# Patient Record
Sex: FEMALE | Race: WHITE | Employment: STUDENT | URBAN - METROPOLITAN AREA
[De-identification: names, ages, dates, MRNs, and addresses within clinical notes are randomized per-mention and may not be internally consistent; named-entity substitution may affect disease eponyms.]

---
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
## 2025-01-15 ENCOUNTER — LAB (OUTPATIENT)
Dept: PRIMARY CARE CLINIC | Age: 20
End: 2025-01-15

## 2025-01-15 DIAGNOSIS — Z23 NEED FOR HEPATITIS B BOOSTER VACCINATION: Primary | ICD-10-CM

## 2025-04-29 ENCOUNTER — OFFICE VISIT (OUTPATIENT)
Dept: PRIMARY CARE CLINIC | Age: 20
End: 2025-04-29

## 2025-04-29 VITALS
HEIGHT: 67 IN | SYSTOLIC BLOOD PRESSURE: 127 MMHG | RESPIRATION RATE: 18 BRPM | HEART RATE: 95 BPM | TEMPERATURE: 97.3 F | BODY MASS INDEX: 37.67 KG/M2 | OXYGEN SATURATION: 100 % | WEIGHT: 240 LBS | DIASTOLIC BLOOD PRESSURE: 86 MMHG

## 2025-04-29 DIAGNOSIS — R21 RASH AND NONSPECIFIC SKIN ERUPTION: Primary | ICD-10-CM

## 2025-04-29 PROCEDURE — 99213 OFFICE O/P EST LOW 20 MIN: CPT | Performed by: NURSE PRACTITIONER

## 2025-04-29 RX ORDER — CLOTRIMAZOLE AND BETAMETHASONE DIPROPIONATE 10; .64 MG/G; MG/G
CREAM TOPICAL
Qty: 45 G | Refills: 0 | Status: SHIPPED | OUTPATIENT
Start: 2025-04-29

## 2025-04-29 NOTE — PROGRESS NOTES
Chief Complaint:   Rash (Patient complains of rash on left arm ongoing for 1 month. Itchy red rash on wrist/forearm. She has tried lotion, hydrocortisone cream.)    History of Present Illness   Source of history provided by:  patient.      Jaelyn Groves is a 19 y.o. old female who presents to walk-in, for sudden onset of red and raised area on left wrist which began 1 month(s) prior to arrival.  The symptoms were caused by unknown cause.  Since onset the symptoms have been persistent.   Prior history of similar episodes: No.   Her symptoms are associated with itching and relieved by nothing, has tried eczema cream and hydrocortisone without much relief.  She denies any additional symptoms.    ROS   Unless otherwise stated in this report or unable to obtain because of the patient's clinical or mental status as evidenced by the medical record, this patients's positive and negative responses for Review of Systems, constitutional, psych, eyes, ENT, cardiovascular, respiratory, gastrointestinal, neurological, genitourinary, musculoskeletal, integument systems and systems related to the presenting problem are either stated in the preceding or were not pertinent or were negative for the symptoms and/or complaints related to the medical problem.    Past Medical History:  has no past medical history on file.   Past Surgical History:  has no past surgical history on file.  Social History:  reports that she has never smoked. She has never used smokeless tobacco. She reports current drug use. Drug: Marijuana (Weed).  Family History: family history is not on file.   Allergies: Pcn [penicillins] and Cefdinir    Physical Exam   Vital Signs:  /86   Pulse 95   Temp 97.3 °F (36.3 °C) (Temporal)   Resp 18   Ht 1.702 m (5' 7\")   Wt 108.9 kg (240 lb)   LMP 03/31/2025   SpO2 100%   BMI 37.59 kg/m²    Oxygen Saturation Interpretation: Normal.    Constitutional:  Alert, development consistent with age.  HEENT:  NC/NT.

## 2025-05-23 ENCOUNTER — OFFICE VISIT (OUTPATIENT)
Dept: PRIMARY CARE CLINIC | Age: 20
End: 2025-05-23

## 2025-05-23 VITALS
OXYGEN SATURATION: 96 % | RESPIRATION RATE: 16 BRPM | BODY MASS INDEX: 37.67 KG/M2 | SYSTOLIC BLOOD PRESSURE: 125 MMHG | HEART RATE: 81 BPM | HEIGHT: 67 IN | DIASTOLIC BLOOD PRESSURE: 83 MMHG | WEIGHT: 240 LBS | TEMPERATURE: 98.6 F

## 2025-05-23 DIAGNOSIS — S80.861A INSECT BITE OF RIGHT LOWER LEG, INITIAL ENCOUNTER: Primary | ICD-10-CM
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
## 2025-05-23 NOTE — PROGRESS NOTES
May 23, 2025     Jaelyn Groves 19 y.o. female   : 2005  Chief Complaint:   Insect Bite (In [facility] on Tuesday got bite and it now hurts and got bigger since with redness )       History of Present Illness:     History of Present Illness  The patient presents for evaluation of a suspected insect bite.    She reports an suspected insect bite after walking in [facility].  The area surrounding the suspected bite has been reddening since Wednesday. She experiences pain and itching at night, which she attributes to increased awareness of the sensation while lying down. There is no current itchiness, but the area becomes mildly painful upon contact with pressure over the area. She reports no drainage from the site and has not scratched or traumatized the area. She has no history of diabetes. Initially, she applied diphenhydramine due to the itchiness, but has since discontinued its use.  She has been afebrile.       Past Medical History:   History reviewed. No pertinent past medical history.    History reviewed. No pertinent surgical history.    History reviewed. No pertinent family history.    Social History     Tobacco Use    Smoking status: Never    Smokeless tobacco: Never   Substance Use Topics    Drug use: Yes     Types: Marijuana (Weed)       Medications:     Current Outpatient Medications:     albuterol sulfate HFA (PROVENTIL;VENTOLIN;PROAIR) 108 (90 Base) MCG/ACT inhaler, Inhale 2 puffs into the lungs every 6 hours as needed, Disp: , Rfl:     Levonorgest-Eth Estrad -Day 0.15-0.03 &0.01 MG TABS, Take 1 tablet by mouth daily, Disp: , Rfl:     clotrimazole-betamethasone (LOTRISONE) 1-0.05 % cream, Apply topically 2 times daily. (Patient not taking: Reported on 2025), Disp: 45 g, Rfl: 0    methylPREDNISolone (MEDROL DOSEPACK) 4 MG tablet, Take by mouth. (Patient not taking: Reported on 2024), Disp: 1 kit, Rfl: 0    Allergies   Allergen Reactions    Pcn [Penicillins] Hives    Cefdinir 
No

## 2025-06-17 ENCOUNTER — LAB (OUTPATIENT)
Dept: PRIMARY CARE CLINIC | Age: 20
End: 2025-06-17

## 2025-06-17 DIAGNOSIS — Z01.84 IMMUNITY STATUS TESTING: ICD-10-CM

## 2025-06-17 DIAGNOSIS — Z23 NEED FOR TDAP VACCINATION: Primary | ICD-10-CM

## 2025-06-17 PROCEDURE — 90471 IMMUNIZATION ADMIN: CPT | Performed by: FAMILY MEDICINE

## 2025-06-17 PROCEDURE — 90715 TDAP VACCINE 7 YRS/> IM: CPT | Performed by: FAMILY MEDICINE

## 2025-06-18 ENCOUNTER — RESULTS FOLLOW-UP (OUTPATIENT)
Dept: PRIMARY CARE CLINIC | Age: 20
End: 2025-06-18

## 2025-06-18 LAB — HBV SURFACE AB TITR SER: >1000 MIU/ML (ref 0–9.99)
